# Patient Record
Sex: FEMALE | Race: BLACK OR AFRICAN AMERICAN | Employment: OTHER | URBAN - METROPOLITAN AREA
[De-identification: names, ages, dates, MRNs, and addresses within clinical notes are randomized per-mention and may not be internally consistent; named-entity substitution may affect disease eponyms.]

---

## 2018-03-29 ENCOUNTER — HOSPITAL ENCOUNTER (OUTPATIENT)
Dept: LAB | Age: 83
Discharge: HOME | End: 2018-03-29

## 2019-12-26 ENCOUNTER — HOSPITAL ENCOUNTER (EMERGENCY)
Age: 84
Discharge: HOME | End: 2019-12-26
Attending: EMERGENCY MEDICINE
Payer: MEDICARE

## 2019-12-26 VITALS
TEMPERATURE: 97.6 F | HEIGHT: 66 IN | WEIGHT: 152.56 LBS | DIASTOLIC BLOOD PRESSURE: 88 MMHG | SYSTOLIC BLOOD PRESSURE: 147 MMHG | BODY MASS INDEX: 24.52 KG/M2 | RESPIRATION RATE: 16 BRPM | OXYGEN SATURATION: 95 % | HEART RATE: 89 BPM

## 2019-12-26 DIAGNOSIS — N93.9 VAGINAL BLEEDING: ICD-10-CM

## 2019-12-26 DIAGNOSIS — T19.2XXA VAGINAL FOREIGN BODY, INITIAL ENCOUNTER: Primary | ICD-10-CM

## 2019-12-26 LAB — INR BLD: 1.6 (ref 0.9–1.2)

## 2019-12-26 PROCEDURE — 85610 PROTHROMBIN TIME: CPT

## 2019-12-26 PROCEDURE — 99283 EMERGENCY DEPT VISIT LOW MDM: CPT

## 2019-12-26 RX ORDER — FUROSEMIDE 80 MG/1
80 TABLET ORAL DAILY
COMMUNITY

## 2019-12-26 RX ORDER — METRONIDAZOLE 7.5 MG/G
CREAM TOPICAL 2 TIMES DAILY
COMMUNITY
End: 2021-09-24

## 2019-12-26 RX ORDER — OMEPRAZOLE 20 MG/1
20 CAPSULE, DELAYED RELEASE ORAL DAILY
COMMUNITY
End: 2021-09-24

## 2019-12-26 RX ORDER — WARFARIN 1 MG/1
7.5 TABLET ORAL DAILY
COMMUNITY

## 2019-12-26 RX ORDER — DOCUSATE SODIUM 100 MG/1
100 CAPSULE, LIQUID FILLED ORAL
COMMUNITY

## 2019-12-26 RX ORDER — WARFARIN 2.5 MG/1
5 TABLET ORAL DAILY
COMMUNITY

## 2019-12-26 RX ORDER — GUAIFENESIN 100 MG/5ML
81 LIQUID (ML) ORAL DAILY
COMMUNITY

## 2019-12-26 RX ORDER — METOLAZONE 2.5 MG/1
2.5 TABLET ORAL DAILY
COMMUNITY

## 2019-12-26 RX ORDER — POTASSIUM CHLORIDE 20 MEQ/1
60 TABLET, EXTENDED RELEASE ORAL 2 TIMES DAILY
COMMUNITY

## 2019-12-26 RX ORDER — SENNOSIDES 8.6 MG/1
2 TABLET ORAL DAILY
COMMUNITY

## 2019-12-26 NOTE — DISCHARGE INSTRUCTIONS
Patient Education        Object in the Vagina: Care Instructions  Your Care Instructions    If something is left in the vagina for too long, it can cause pain and irritation. This could be a tampon, a diaphragm, a pessary, or a vibrator. Sometimes, a condom comes off during sex and gets lost in the vagina. You may have bleeding, a bad smell, and a discharge from the vagina. After the object is taken out, symptoms usually go away. Follow-up care is a key part of your treatment and safety. Be sure to make and go to all appointments, and call your doctor if you are having problems. It's also a good idea to know your test results and keep a list of the medicines you take. How can you care for yourself at home? · If your doctor prescribed antibiotics, take them as directed. Do not stop taking them just because you feel better. You need to take the full course of antibiotics. · Do not use a douche or vaginal wash unless your doctor tells you to. · You can prevent future problems if you limit how long something is in your vagina. For example, change a tampon at least every 4 to 8 hours. When should you call for help? Watch closely for changes in your health, and be sure to contact your doctor if:    · Your symptoms do not improve, or they get worse.     · You have a fever. Where can you learn more? Go to http://zeke-demar.info/. Enter T994 in the search box to learn more about \"Object in the Vagina: Care Instructions. \"  Current as of: June 26, 2019  Content Version: 12.2  © 5330-3037 [employer]. Care instructions adapted under license by [employer] (which disclaims liability or warranty for this information). If you have questions about a medical condition or this instruction, always ask your healthcare professional. [address] any warranty or liability for your use of this information.

## 2019-12-26 NOTE — ED TRIAGE NOTES
\"I was masturbating and the cap of the perfume bottle is up in my vaginia and I cant get it out\". Happened between 3-4pm today. Denies pain. Tried to remove it but couldn't it out.

## 2019-12-26 NOTE — ED PROVIDER NOTES
Pt was masturbating a few hours ago with a perfume bottle and the cap is not accounted for. She tried to get the cap out with her fingers. She did experience some mild bleeding from her vagina. She has not had a hysterectomy. She does not see gynecology on a regular basis. No abdominal pain. No feeling faint. No recent illness. Old chart reviewed: No prior ED visits. She had an orthopedic appointment earlier this month for knee osteoarthritis. That was with [facility]. Past Medical History:   Diagnosis Date    Arthritis     Heart failure ([address]     \"leaky valve\"       Past Surgical History:   Procedure Laterality Date    HX GI      rectal    HX HEENT      \"eye surgery\"    HX PACEMAKER           History reviewed. No pertinent family history.     Social History     Socioeconomic History    Marital status:      Spouse name: Not on file    Number of children: Not on file    Years of education: Not on file    Highest education level: Not on file   Occupational History    Not on file   Social Needs    Financial resource strain: Not on file    Food insecurity:     Worry: Not on file     Inability: Not on file    Transportation needs:     Medical: Not on file     Non-medical: Not on file   Tobacco Use    Smoking status: Never Smoker    Smokeless tobacco: Never Used   Substance and Sexual Activity    Alcohol use: Yes     Comment: occasionally has 1 glass of beer or wine    Drug use: Never    Sexual activity: Not on file   Lifestyle    Physical activity:     Days per week: Not on file     Minutes per session: Not on file    Stress: Not on file   Relationships    Social connections:     Talks on phone: Not on file     Gets together: Not on file     Attends Gnosticist service: Not on file     Active member of club or organization: Not on file     Attends meetings of clubs or organizations: Not on file     Relationship status: Not on file    Intimate partner violence: Fear of current or ex partner: Not on file     Emotionally abused: Not on file     Physically abused: Not on file     Forced sexual activity: Not on file   Other Topics Concern    Not on file   Social History Narrative    Not on file         ALLERGIES: Patient has no known allergies. Review of Systems   All other systems reviewed and are negative. Vitals:    12/26/19 1802   BP: 147/88   Pulse: 89   Resp: 16   Temp: 97.6 °F (36.4 °C)   SpO2: 95%   Weight: 69.2 kg (152 lb 8.9 oz)   Height: 5' 6\" (1.676 m)            Physical Exam  Constitutional:       Comments: Frail, elderly   HENT:      Head: Normocephalic. Nose: Nose normal.      Mouth/Throat:      Mouth: Mucous membranes are moist.   Eyes:      Pupils: Pupils are equal, round, and reactive to light. Cardiovascular:      Rate and Rhythm: Normal rate and regular rhythm. Pulses: Normal pulses. Pulmonary:      Effort: Pulmonary effort is normal.   Abdominal:      General: Abdomen is flat. Palpations: Abdomen is soft. Tenderness: There is no tenderness. Genitourinary:     Comments: On vaginal exam via speculum, there is a large perfume bottle cap that was oriented transversely. It is plastic. There is an edge to it. There was a minimum amount of blood in the vagina. Source of trauma cannot be visualized. There is certainly no brisk bleeding. Musculoskeletal:         General: No swelling. Skin:     General: Skin is warm and dry. Capillary Refill: Capillary refill takes less than 2 seconds. Neurological:      Mental Status: She is alert. Psychiatric:         Mood and Affect: Mood normal.          MDM       Procedures        While in the [facility], I tried with the ring forceps several times to get the bottle cap out without success. I then used to the Abel forceps from the intubation kit. I was able to grab a hold of the edge of the bottle, take the speculum out while still holding onto the bottle.   Then it came out easily. It was a little uncomfortable for the patient but that passed. No pain after the procedure was done. D/w Dr Redmond Filler [facility]) - f/u prn in office only if pt wants to.       Labs Reviewed   POC INR - Abnormal; Notable for the following components:       Result Value    INR (POC) 1.6 (*)     All other components within normal limits

## 2019-12-27 NOTE — ED NOTES
I have reviewed discharge instructions with the patient. The patient verbalized understanding. ambulatory to vehicle with family. Nad. No c/o.

## 2021-09-24 ENCOUNTER — APPOINTMENT (OUTPATIENT)
Dept: GENERAL RADIOLOGY | Age: 86
End: 2021-09-24
Attending: EMERGENCY MEDICINE
Payer: MEDICARE

## 2021-09-24 ENCOUNTER — HOSPITAL ENCOUNTER (EMERGENCY)
Age: 86
Discharge: HOME | End: 2021-09-24
Attending: EMERGENCY MEDICINE
Payer: MEDICARE

## 2021-09-24 ENCOUNTER — APPOINTMENT (OUTPATIENT)
Dept: CT IMAGING | Age: 86
End: 2021-09-24
Attending: EMERGENCY MEDICINE
Payer: MEDICARE

## 2021-09-24 VITALS
BODY MASS INDEX: 25.01 KG/M2 | RESPIRATION RATE: 23 BRPM | OXYGEN SATURATION: 92 % | WEIGHT: 154.98 LBS | SYSTOLIC BLOOD PRESSURE: 125 MMHG | TEMPERATURE: 98.4 F | HEART RATE: 81 BPM | DIASTOLIC BLOOD PRESSURE: 83 MMHG

## 2021-09-24 DIAGNOSIS — R53.83 FATIGUE, UNSPECIFIED TYPE: Primary | ICD-10-CM

## 2021-09-24 DIAGNOSIS — N30.90 CYSTITIS: ICD-10-CM

## 2021-09-24 LAB
ALBUMIN SERPL-MCNC: 3.9 G/DL (ref 3.5–5)
ALBUMIN/GLOB SERPL: 0.9 {RATIO} (ref 1.1–2.2)
ALP SERPL-CCNC: 95 U/L (ref 45–117)
ALT SERPL-CCNC: 19 U/L (ref 12–78)
ANION GAP SERPL CALC-SCNC: 11 MMOL/L (ref 5–15)
APPEARANCE UR: ABNORMAL
AST SERPL-CCNC: 31 U/L (ref 15–37)
ATRIAL RATE: 76 BPM
BACTERIA URNS QL MICRO: ABNORMAL /HPF
BASOPHILS # BLD: 0.1 K/UL (ref 0–0.1)
BASOPHILS NFR BLD: 1 % (ref 0–1)
BILIRUB SERPL-MCNC: 1.2 MG/DL (ref 0.2–1)
BILIRUB UR QL: NEGATIVE
BNP SERPL-MCNC: 2114 PG/ML (ref 0–450)
BUN SERPL-MCNC: 15 MG/DL (ref 6–20)
BUN/CREAT SERPL: 12 (ref 12–20)
CALCIUM SERPL-MCNC: 9.6 MG/DL (ref 8.5–10.1)
CALCULATED R AXIS, ECG10: -39 DEGREES
CALCULATED T AXIS, ECG11: -4 DEGREES
CHLORIDE SERPL-SCNC: 99 MMOL/L (ref 97–108)
CO2 SERPL-SCNC: 28 MMOL/L (ref 21–32)
COLOR UR: ABNORMAL
COMMENT, HOLDF: NORMAL
CREAT SERPL-MCNC: 1.23 MG/DL (ref 0.55–1.02)
DIAGNOSIS, 93000: NORMAL
DIFFERENTIAL METHOD BLD: ABNORMAL
EOSINOPHIL # BLD: 0.1 K/UL (ref 0–0.4)
EOSINOPHIL NFR BLD: 2 % (ref 0–7)
EPITH CASTS URNS QL MICRO: ABNORMAL /LPF
ERYTHROCYTE [DISTWIDTH] IN BLOOD BY AUTOMATED COUNT: 13.2 % (ref 11.5–14.5)
GLOBULIN SER CALC-MCNC: 4.2 G/DL (ref 2–4)
GLUCOSE SERPL-MCNC: 166 MG/DL (ref 65–100)
GLUCOSE UR STRIP.AUTO-MCNC: NEGATIVE MG/DL
HCT VFR BLD AUTO: 38.8 % (ref 35–47)
HGB BLD-MCNC: 13.1 G/DL (ref 11.5–16)
HGB UR QL STRIP: ABNORMAL
IMM GRANULOCYTES # BLD AUTO: 0 K/UL (ref 0–0.04)
IMM GRANULOCYTES NFR BLD AUTO: 0 % (ref 0–0.5)
KETONES UR QL STRIP.AUTO: NEGATIVE MG/DL
LEUKOCYTE ESTERASE UR QL STRIP.AUTO: ABNORMAL
LYMPHOCYTES # BLD: 1.1 K/UL (ref 0.8–3.5)
LYMPHOCYTES NFR BLD: 21 % (ref 12–49)
MCH RBC QN AUTO: 26.7 PG (ref 26–34)
MCHC RBC AUTO-ENTMCNC: 33.8 G/DL (ref 30–36.5)
MCV RBC AUTO: 79 FL (ref 80–99)
MONOCYTES # BLD: 0.5 K/UL (ref 0–1)
MONOCYTES NFR BLD: 10 % (ref 5–13)
NEUTS SEG # BLD: 3.5 K/UL (ref 1.8–8)
NEUTS SEG NFR BLD: 66 % (ref 32–75)
NITRITE UR QL STRIP.AUTO: NEGATIVE
NRBC # BLD: 0 K/UL (ref 0–0.01)
NRBC BLD-RTO: 0 PER 100 WBC
P-R INTERVAL, ECG05: 216 MS
PH UR STRIP: 7 [PH] (ref 5–8)
PLATELET # BLD AUTO: 191 K/UL (ref 150–400)
PMV BLD AUTO: 10.1 FL (ref 8.9–12.9)
POTASSIUM SERPL-SCNC: 3.1 MMOL/L (ref 3.5–5.1)
PROT SERPL-MCNC: 8.1 G/DL (ref 6.4–8.2)
PROT UR STRIP-MCNC: NEGATIVE MG/DL
Q-T INTERVAL, ECG07: 384 MS
QRS DURATION, ECG06: 132 MS
QTC CALCULATION (BEZET), ECG08: 467 MS
RBC # BLD AUTO: 4.91 M/UL (ref 3.8–5.2)
RBC #/AREA URNS HPF: ABNORMAL /HPF (ref 0–5)
SAMPLES BEING HELD,HOLD: NORMAL
SODIUM SERPL-SCNC: 138 MMOL/L (ref 136–145)
SP GR UR REFRACTOMETRY: 1.01 (ref 1–1.03)
TROPONIN I SERPL-MCNC: <0.05 NG/ML
UA: UC IF INDICATED,UAUC: ABNORMAL
UROBILINOGEN UR QL STRIP.AUTO: 1 EU/DL (ref 0.2–1)
VENTRICULAR RATE, ECG03: 89 BPM
WBC # BLD AUTO: 5.2 K/UL (ref 3.6–11)
WBC URNS QL MICRO: ABNORMAL /HPF (ref 0–4)

## 2021-09-24 PROCEDURE — 83880 ASSAY OF NATRIURETIC PEPTIDE: CPT

## 2021-09-24 PROCEDURE — 99285 EMERGENCY DEPT VISIT HI MDM: CPT

## 2021-09-24 PROCEDURE — 74011000258 HC RX REV CODE- 258: Performed by: EMERGENCY MEDICINE

## 2021-09-24 PROCEDURE — 85025 COMPLETE CBC W/AUTO DIFF WBC: CPT

## 2021-09-24 PROCEDURE — 74011250636 HC RX REV CODE- 250/636: Performed by: EMERGENCY MEDICINE

## 2021-09-24 PROCEDURE — 70450 CT HEAD/BRAIN W/O DYE: CPT

## 2021-09-24 PROCEDURE — 87186 SC STD MICRODIL/AGAR DIL: CPT

## 2021-09-24 PROCEDURE — 71045 X-RAY EXAM CHEST 1 VIEW: CPT

## 2021-09-24 PROCEDURE — 96365 THER/PROPH/DIAG IV INF INIT: CPT

## 2021-09-24 PROCEDURE — 87077 CULTURE AEROBIC IDENTIFY: CPT

## 2021-09-24 PROCEDURE — 93005 ELECTROCARDIOGRAM TRACING: CPT

## 2021-09-24 PROCEDURE — 81001 URINALYSIS AUTO W/SCOPE: CPT

## 2021-09-24 PROCEDURE — 87086 URINE CULTURE/COLONY COUNT: CPT

## 2021-09-24 PROCEDURE — 84484 ASSAY OF TROPONIN QUANT: CPT

## 2021-09-24 PROCEDURE — 80053 COMPREHEN METABOLIC PANEL: CPT

## 2021-09-24 RX ORDER — NITROFURANTOIN 25; 75 MG/1; MG/1
100 CAPSULE ORAL 2 TIMES DAILY
Qty: 12 CAPSULE | Refills: 0 | Status: SHIPPED | OUTPATIENT
Start: 2021-09-24 | End: 2021-09-30

## 2021-09-24 RX ORDER — NITROFURANTOIN 25; 75 MG/1; MG/1
100 CAPSULE ORAL 2 TIMES DAILY
Qty: 12 CAPSULE | Refills: 0 | Status: SHIPPED | OUTPATIENT
Start: 2021-09-24 | End: 2021-09-24 | Stop reason: SDUPTHER

## 2021-09-24 RX ADMIN — SODIUM CHLORIDE 1000 ML: 9 INJECTION, SOLUTION INTRAVENOUS at 14:41

## 2021-09-24 RX ADMIN — CEFTRIAXONE SODIUM 1 G: 1 INJECTION, POWDER, FOR SOLUTION INTRAMUSCULAR; INTRAVENOUS at 17:09

## 2021-09-24 NOTE — ED NOTES
Pt ambulatory out of ED with discharge instructions and prescriptions in hand given by Dr. Ashleigh Soctt; pt verbalized understanding of discharge paperwork and time allotted for questions. VSS. Pt alert and oriented. Pt accompanied by family member.

## 2021-09-24 NOTE — ED TRIAGE NOTES
Triage: pt c/o generalized weakness and dizziness for several days but worse this AM. Denies SOB, cough, chest pain, fever or blurred vision.

## 2021-09-24 NOTE — ED PROVIDER NOTES
59-year-old female history of arthritis, leaky heart valve presents to the emergency department with chief complaint of feeling generalized dizziness and weakness for the past couple days which is worse today. She woke up early without eating to go to a test, outpatient ultrasound, and is felt generalized fatigue. No fevers or shortness of breath. No chest pain. No nausea or vomiting. The history is provided by the patient and medical records. Dizziness  This is a new problem. The current episode started more than 2 days ago. The problem has been rapidly worsening. Pertinent negatives include no focal weakness, no loss of sensation, no speech difficulty, no memory loss, no movement disorder, no agitation, no visual change, no mental status change and no disorientation. There has been no fever. Pertinent negatives include no shortness of breath, no chest pain, no vomiting, no altered mental status, no confusion, no headaches, no choking, no nausea, no bowel incontinence and no bladder incontinence. Associated medical issues do not include trauma or seizures. Fatigue  Pertinent negatives include no focal weakness, no loss of sensation, no speech difficulty, no memory loss, no movement disorder, no agitation, no visual change, no mental status change and no disorientation. Pertinent negatives include no shortness of breath, no chest pain, no vomiting, no altered mental status, no confusion, no headaches, no choking, no nausea, no bowel incontinence and no bladder incontinence. Associated medical issues do not include trauma or seizures. Past Medical History:   Diagnosis Date    Arthritis     Heart failure ([address]     \"leaky valve\"       Past Surgical History:   Procedure Laterality Date    HX GI      rectal    HX HEENT      \"eye surgery\"    HX PACEMAKER           History reviewed. No pertinent family history.     Social History     Socioeconomic History    Marital status:      Spouse name: Not on file    Number of children: Not on file    Years of education: Not on file    Highest education level: Not on file   Occupational History    Not on file   Tobacco Use    Smoking status: Never Smoker    Smokeless tobacco: Never Used   Substance and Sexual Activity    Alcohol use: Yes     Comment: occasionally has 1 glass of beer or wine    Drug use: Never    Sexual activity: Not on file   Other Topics Concern    Not on file   Social History Narrative    Not on file     Social Determinants of Health     Financial Resource Strain:     Difficulty of Paying Living Expenses:    Food Insecurity:     Worried About Running Out of Food in the Last Year:     [address] in the Last Year:    Transportation Needs:     Lack of Transportation (Medical):  Lack of Transportation (Non-Medical):    Physical Activity:     Days of Exercise per Week:     Minutes of Exercise per Session:    Stress:     Feeling of Stress :    Social Connections:     Frequency of Communication with Friends and Family:     Frequency of Social Gatherings with Friends and Family:     Attends Voodoo Services:     Active Member of Clubs or Organizations:     Attends Club or Organization Meetings:     Marital Status:    Intimate Partner Violence:     Fear of Current or Ex-Partner:     Emotionally Abused:     Physically Abused:     Sexually Abused: ALLERGIES: Patient has no known allergies. Review of Systems   Constitutional: Positive for fatigue. Negative for fever. HENT: Negative for sneezing and sore throat. Respiratory: Negative for cough, choking and shortness of breath. Cardiovascular: Negative for chest pain and leg swelling. Gastrointestinal: Negative for abdominal pain, bowel incontinence, diarrhea, nausea and vomiting. Genitourinary: Negative for bladder incontinence, difficulty urinating and dysuria. Musculoskeletal: Negative for arthralgias and myalgias.    Skin: Negative for color change and rash. Neurological: Positive for dizziness. Negative for focal weakness, speech difficulty, weakness and headaches. Psychiatric/Behavioral: Negative for agitation, behavioral problems, confusion and memory loss. There were no vitals filed for this visit. Physical Exam  Vitals and nursing note reviewed. Constitutional:       General: She is not in acute distress. Appearance: Normal appearance. She is well-developed. She is not ill-appearing, toxic-appearing or diaphoretic. HENT:      Head: Normocephalic and atraumatic. Nose: Nose normal.      Mouth/Throat:      Mouth: Mucous membranes are moist.      Pharynx: Oropharynx is clear. Eyes:      Extraocular Movements: Extraocular movements intact. Conjunctiva/sclera: Conjunctivae normal.      Pupils: Pupils are equal, round, and reactive to light. Cardiovascular:      Rate and Rhythm: Normal rate and regular rhythm. Pulses: Normal pulses. Heart sounds: Normal heart sounds. Pulmonary:      Effort: Pulmonary effort is normal. No respiratory distress. Breath sounds: Normal breath sounds. No wheezing. Chest:      Chest wall: No tenderness. Abdominal:      General: Abdomen is flat. There is no distension. Palpations: Abdomen is soft. Tenderness: There is no abdominal tenderness. There is no guarding or rebound. Musculoskeletal:         General: No swelling, tenderness, deformity or signs of injury. Normal range of motion. Cervical back: Normal range of motion and neck supple. No rigidity. No muscular tenderness. Right lower leg: Edema present. Left lower leg: No edema. Skin:     General: Skin is warm and dry. Capillary Refill: Capillary refill takes less than 2 seconds. Neurological:      General: No focal deficit present. Mental Status: She is alert and oriented to person, place, and time.    Psychiatric:         Mood and Affect: Mood normal.         Behavior: Behavior normal.          MDM  Number of Diagnoses or Management Options  Diagnosis management comments: 24-year-old female presents as above with generalized dizziness and weakness. Her work-up is remarkable for mild dehydration with evidence of UTI. Plan to treat with antibiotics, follow-up with primary care, return if needed. Amount and/or Complexity of Data Reviewed  Clinical lab tests: reviewed  Tests in the radiology section of CPT®: reviewed  Tests in the medicine section of CPT®: reviewed           Procedures              ED EKG interpretation:  Rhythm: Sinus rhythm with first-degree AV block and rate of approximately 89 with left axis deviation and right bundle branch block morphology concerning for bifascicular block. ST segment: Nonspecific ST segment changes including precordial depressions. This EKG was interpreted by Alessio Barrett MD,ED Provider.

## 2021-09-26 LAB
BACTERIA SPEC CULT: ABNORMAL
CC UR VC: ABNORMAL
SERVICE CMNT-IMP: ABNORMAL

## 2021-12-21 ENCOUNTER — TRANSCRIBE ORDER (OUTPATIENT)
Dept: SCHEDULING | Age: 86
End: 2021-12-21

## 2021-12-21 DIAGNOSIS — H47.20 OPTIC NERVE ATROPHY: Primary | ICD-10-CM

## 2022-02-01 ENCOUNTER — TRANSCRIBE ORDER (OUTPATIENT)
Dept: SCHEDULING | Age: 87
End: 2022-02-01

## 2022-02-01 DIAGNOSIS — H35.50 OPTIC ATROPHY ASSOCIATED WITH RETINAL DYSTROPHIES: Primary | ICD-10-CM

## 2022-02-01 DIAGNOSIS — H47.20 OPTIC ATROPHY ASSOCIATED WITH RETINAL DYSTROPHIES: Primary | ICD-10-CM

## 2022-02-11 ENCOUNTER — HOSPITAL ENCOUNTER (OUTPATIENT)
Dept: CT IMAGING | Age: 87
Discharge: HOME | End: 2022-02-11
Attending: STUDENT IN AN ORGANIZED HEALTH CARE EDUCATION/TRAINING PROGRAM
Payer: MEDICARE

## 2022-02-11 DIAGNOSIS — H47.20 OPTIC ATROPHY ASSOCIATED WITH RETINAL DYSTROPHIES: ICD-10-CM

## 2022-02-11 DIAGNOSIS — H35.50 OPTIC ATROPHY ASSOCIATED WITH RETINAL DYSTROPHIES: ICD-10-CM

## 2022-02-11 PROCEDURE — 70480 CT ORBIT/EAR/FOSSA W/O DYE: CPT

## 2022-12-13 ENCOUNTER — OFFICE VISIT (OUTPATIENT)
Dept: GYNECOLOGY | Age: 87
End: 2022-12-13
Payer: MEDICARE

## 2022-12-13 VITALS
WEIGHT: 160 LBS | HEART RATE: 72 BPM | BODY MASS INDEX: 25.71 KG/M2 | SYSTOLIC BLOOD PRESSURE: 132 MMHG | HEIGHT: 66 IN | DIASTOLIC BLOOD PRESSURE: 86 MMHG

## 2022-12-13 DIAGNOSIS — R93.89 THICKENED ENDOMETRIUM: ICD-10-CM

## 2022-12-13 DIAGNOSIS — N95.0 POSTMENOPAUSAL BLEEDING: Primary | ICD-10-CM

## 2022-12-13 PROCEDURE — 1123F ACP DISCUSS/DSCN MKR DOCD: CPT | Performed by: OBSTETRICS & GYNECOLOGY

## 2022-12-13 PROCEDURE — 1101F PT FALLS ASSESS-DOCD LE1/YR: CPT | Performed by: OBSTETRICS & GYNECOLOGY

## 2022-12-13 PROCEDURE — G8432 DEP SCR NOT DOC, RNG: HCPCS | Performed by: OBSTETRICS & GYNECOLOGY

## 2022-12-13 PROCEDURE — G8417 CALC BMI ABV UP PARAM F/U: HCPCS | Performed by: OBSTETRICS & GYNECOLOGY

## 2022-12-13 PROCEDURE — 1090F PRES/ABSN URINE INCON ASSESS: CPT | Performed by: OBSTETRICS & GYNECOLOGY

## 2022-12-13 PROCEDURE — G0463 HOSPITAL OUTPT CLINIC VISIT: HCPCS | Performed by: OBSTETRICS & GYNECOLOGY

## 2022-12-13 PROCEDURE — G8536 NO DOC ELDER MAL SCRN: HCPCS | Performed by: OBSTETRICS & GYNECOLOGY

## 2022-12-13 PROCEDURE — G8427 DOCREV CUR MEDS BY ELIG CLIN: HCPCS | Performed by: OBSTETRICS & GYNECOLOGY

## 2022-12-13 PROCEDURE — 99204 OFFICE O/P NEW MOD 45 MIN: CPT | Performed by: OBSTETRICS & GYNECOLOGY

## 2022-12-13 RX ORDER — VALACYCLOVIR HYDROCHLORIDE 500 MG/1
TABLET, FILM COATED ORAL
COMMUNITY
Start: 2022-12-06

## 2022-12-13 NOTE — PROGRESS NOTES
[address] [place] [address] [address]  P [phone]  F [phone]    Office Note  Patient ID:  Name:  Meena Prakash  MRN:  [id number]  :  1935/87 y.o. Date:  2022      HISTORY OF PRESENT ILLNESS:  Meena Prakash is a 80 y.o.  postmenopausal female who is a new patient being seen for postmenopausal bleeding. She is referred by Dr. Ita Mak with [facility]. She had presented to the ER at [facility] with vaginal bleeding. An ultrasound there demonstrated uterine fibroids and a thickened endometrium. An endometrial biopsy was attempted in the office, but unsuccessful. The cervix was reportedly flush with the vaginal apex. I have been asked to see her in consultation for further evaluation and management of her bleeding. She has congestive heart failure and was told that she is not a candidate for any kind of surgery. She last saw her cardiologist about 3 months ago and has another appointment scheduled next month. ROS:   and GI review:  Negative  Cardiopulmonary review:  Negative   Musculoskeletal:  Negative    A comprehensive review of systems was negative except for that written in the History of Present Illness. , 10 point ROS      OB/GYN ROS/HX:    Vaginal delivery x 1      Problem List:  There are no problems to display for this patient. PMH:  Past Medical History:   Diagnosis Date    Arthritis     Heart failure ([address]     \"leaky valve\"      PSH:  Past Surgical History:   Procedure Laterality Date    HX GI      rectal    HX HEENT      \"eye surgery\"    HX PACEMAKER        Social History:  Social History     Tobacco Use    Smoking status: Never    Smokeless tobacco: Never   Substance Use Topics    Alcohol use: Yes     Comment: occasionally has 1 glass of beer or wine      Family History:  History reviewed. No pertinent family history.    Medications: (reviewed)  Current Outpatient Medications   Medication Sig    valACYclovir (VALTREX) 500 mg tablet     aspirin 81 mg chewable tablet Take 81 mg by mouth daily. senna (SENOKOT) 8.6 mg tablet Take 2 Tabs by mouth daily. docusate sodium (COLACE) 100 mg capsule Take 100 mg by mouth nightly. furosemide (LASIX) 80 mg tablet Take 80 mg by mouth daily. warfarin (COUMADIN) 2.5 mg tablet Take 5 mg by mouth daily. Monday - Wednesday - Friday    warfarin (COUMADIN) 1 mg tablet Take 7.5 mg by mouth daily. Tuesday - Thursday - Saturday - Sunday    potassium chloride (K-DUR, KLOR-CON) 20 mEq tablet Take 60 mEq by mouth two (2) times a day. metOLazone (ZAROXOLYN) 2.5 mg tablet Take 5 mg by mouth daily. Takes one every third day. No current facility-administered medications for this visit. Allergies: (reviewed)  No Known Allergies       OBJECTIVE:    Physical Exam:  VITAL SIGNS: Vitals:    12/13/22 1053   BP: 132/86   Pulse: 72   Weight: 160 lb (72.6 kg)   Height: 5' 5.98\" (1.676 m)     Body mass index is 25.84 kg/m². GENERAL JAIRO: Conversant, alert, oriented. No acute distress. HEENT: HEENT. No thyroid enlargement. No JVD. Neck: Supple without restrictions. RESPIRATORY: Clear to auscultation and percussion to the bases. No CVAT. CARDIOVASC: RRR without murmur/rub. GASTROINT: soft, non-tender, without masses or organomegaly   MUSCULOSKEL: no joint tenderness, deformity or swelling   EXTREMITIES: extremities normal, atraumatic, no cyanosis or edema   PELVIC: Normal external genitalia. Normal vagina. Small dimple of a cervix. Uterus not enlarged and mobile. Large amount of stool in the rectum. RECTAL: Deferred   MACRINA SURVEY: No suspicious lymphadenopathy or edema noted. NEURO: Grossly intact. No acute deficit.        Lab Data:    Lab Results   Component Value Date/Time    WBC 5.2 09/24/2021 01:34 PM    HGB 13.1 09/24/2021 01:34 PM    HCT 38.8 09/24/2021 01:34 PM    PLATELET 683 40/63/9919 01:34 PM    MCV 79.0 (L) 09/24/2021 01:34 PM     Lab Results   Component Value Date/Time    Sodium 138 09/24/2021 01:34 PM    Potassium 3.1 (L) 09/24/2021 01:34 PM    Chloride 99 09/24/2021 01:34 PM    CO2 28 09/24/2021 01:34 PM    Anion gap 11 09/24/2021 01:34 PM    Glucose 166 (H) 09/24/2021 01:34 PM    BUN 15 09/24/2021 01:34 PM    Creatinine 1.23 (H) 09/24/2021 01:34 PM    BUN/Creatinine ratio 12 09/24/2021 01:34 PM    GFR est AA 50 (L) 09/24/2021 01:34 PM    GFR est non-AA 41 (L) 09/24/2021 01:34 PM    Calcium 9.6 09/24/2021 01:34 PM         Imaging: Outside imaging unavailable at time of visit. IMPRESSION/PLAN:  Urmila Blair is a 80 y.o. female with a diagnosis of postmenopausal bleeding, associated with a thickened endometrium. I reviewed with Urmila Blair her medical records, physical exam, and review of symptoms. I discussed with her and her daughter the differential diagnosis of her bleeding, including both benign and malignant conditions. I recommended a hysteroscopy/D&C to evaluate the bleeding and thickened endometrium. We should be able to do that with minimal anesthesia. She was counseled on the risks, benefits, indications and alternatives of the planned procedure. We discussed the possible surgical risks of bleeding, infection, potential injury to other organs/structures, and the risks of anesthesia. Her questions were answered to her satisfaction and she wishes to proceed as planned. If we find that a malignancy is present, we will then need to discuss the possibility of hysterectomy. We will then need clearance from her cardiologist.        An electronic signature was used to sign this note.     Meera Soria MD  12/13/22

## 2022-12-13 NOTE — LETTER
2022 12:40 PM    Patient: Meena Prakash   YOB: 1935  Date of Visit: 2022      Dear Ita Mak MD  [address] [address]  Via In [facility] [place]  [address] [address]  Via Fax: [phone]:      Thank you for referring Ms. Meena Prakash to me for evaluation/treatment. Below are the relevant portions of my assessment and plan of care. [address], [address]  P [phone]  F [phone]    Office Note  Patient ID:  Name:  Meena Prakash  MRN:  [id number]  :  1935/87 y.o. Date:  2022      HISTORY OF PRESENT ILLNESS:  Meena Prakash is a 80 y.o.  postmenopausal female who is a new patient being seen for postmenopausal bleeding. She is referred by Dr. Ita Mak with [facility]. She had presented to the ER at [facility] with vaginal bleeding. An ultrasound there demonstrated uterine fibroids and a thickened endometrium. An endometrial biopsy was attempted in the office, but unsuccessful. The cervix was reportedly flush with the vaginal apex. I have been asked to see her in consultation for further evaluation and management of her bleeding. She has congestive heart failure and was told that she is not a candidate for any kind of surgery. She last saw her cardiologist about 3 months ago and has another appointment scheduled next month. ROS:   and GI review:  Negative  Cardiopulmonary review:  Negative   Musculoskeletal:  Negative    A comprehensive review of systems was negative except for that written in the History of Present Illness. , 10 point ROS      OB/GYN ROS/HX:    Vaginal delivery x 1      Problem List:  There are no problems to display for this patient.     PMH:  Past Medical History:   Diagnosis Date    Arthritis     Heart failure ([address]     \"leaky valve\" PSH:  Past Surgical History:   Procedure Laterality Date    HX GI      rectal    HX HEENT      \"eye surgery\"    HX PACEMAKER        Social History:  Social History     Tobacco Use    Smoking status: Never    Smokeless tobacco: Never   Substance Use Topics    Alcohol use: Yes     Comment: occasionally has 1 glass of beer or wine      Family History:  History reviewed. No pertinent family history. Medications: (reviewed)  Current Outpatient Medications   Medication Sig    valACYclovir (VALTREX) 500 mg tablet     aspirin 81 mg chewable tablet Take 81 mg by mouth daily.  senna (SENOKOT) 8.6 mg tablet Take 2 Tabs by mouth daily.  docusate sodium (COLACE) 100 mg capsule Take 100 mg by mouth nightly.  furosemide (LASIX) 80 mg tablet Take 80 mg by mouth daily.  warfarin (COUMADIN) 2.5 mg tablet Take 5 mg by mouth daily. Monday - Wednesday - Friday    warfarin (COUMADIN) 1 mg tablet Take 7.5 mg by mouth daily. Tuesday - Thursday - Saturday - Sunday    potassium chloride (K-DUR, KLOR-CON) 20 mEq tablet Take 60 mEq by mouth two (2) times a day.  metOLazone (ZAROXOLYN) 2.5 mg tablet Take 5 mg by mouth daily. Takes one every third day. No current facility-administered medications for this visit. Allergies: (reviewed)  No Known Allergies       OBJECTIVE:    Physical Exam:  VITAL SIGNS: Vitals:    12/13/22 1053   BP: 132/86   Pulse: 72   Weight: 160 lb (72.6 kg)   Height: 5' 5.98\" (1.676 m)     Body mass index is 25.84 kg/m². GENERAL JAIRO: Conversant, alert, oriented. No acute distress. HEENT: HEENT. No thyroid enlargement. No JVD. Neck: Supple without restrictions. RESPIRATORY: Clear to auscultation and percussion to the bases. No CVAT. CARDIOVASC: RRR without murmur/rub.    GASTROINT: soft, non-tender, without masses or organomegaly   MUSCULOSKEL: no joint tenderness, deformity or swelling   EXTREMITIES: extremities normal, atraumatic, no cyanosis or edema   PELVIC: Normal external genitalia. Normal vagina. Small dimple of a cervix. Uterus not enlarged and mobile. Large amount of stool in the rectum. RECTAL: Deferred   MACRINA SURVEY: No suspicious lymphadenopathy or edema noted. NEURO: Grossly intact. No acute deficit. Lab Data:    Lab Results   Component Value Date/Time    WBC 5.2 09/24/2021 01:34 PM    HGB 13.1 09/24/2021 01:34 PM    HCT 38.8 09/24/2021 01:34 PM    PLATELET 883 21/13/1214 01:34 PM    MCV 79.0 (L) 09/24/2021 01:34 PM     Lab Results   Component Value Date/Time    Sodium 138 09/24/2021 01:34 PM    Potassium 3.1 (L) 09/24/2021 01:34 PM    Chloride 99 09/24/2021 01:34 PM    CO2 28 09/24/2021 01:34 PM    Anion gap 11 09/24/2021 01:34 PM    Glucose 166 (H) 09/24/2021 01:34 PM    BUN 15 09/24/2021 01:34 PM    Creatinine 1.23 (H) 09/24/2021 01:34 PM    BUN/Creatinine ratio 12 09/24/2021 01:34 PM    GFR est AA 50 (L) 09/24/2021 01:34 PM    GFR est non-AA 41 (L) 09/24/2021 01:34 PM    Calcium 9.6 09/24/2021 01:34 PM         Imaging: Outside imaging unavailable at time of visit. IMPRESSION/PLAN:  Kingsley Gray is a 80 y.o. female with a diagnosis of postmenopausal bleeding, associated with a thickened endometrium. I reviewed with Kingsley Gray her medical records, physical exam, and review of symptoms. I discussed with her and her daughter the differential diagnosis of her bleeding, including both benign and malignant conditions. I recommended a hysteroscopy/D&C to evaluate the bleeding and thickened endometrium. We should be able to do that with minimal anesthesia. She was counseled on the risks, benefits, indications and alternatives of the planned procedure. We discussed the possible surgical risks of bleeding, infection, potential injury to other organs/structures, and the risks of anesthesia. Her questions were answered to her satisfaction and she wishes to proceed as planned.   If we find that a malignancy is present, we will then need to discuss the possibility of hysterectomy. We will then need clearance from her cardiologist.        An electronic signature was used to sign this note. Jodi Barragan MD  12/13/22          New patient referred by [facility] for PMB. If you have questions, please do not hesitate to call me. I look forward to following Ms. Chavezbette Sigrid along with you.         Sincerely,      Jodi Barragan MD

## 2022-12-22 ENCOUNTER — HOSPITAL ENCOUNTER (OUTPATIENT)
Dept: GENERAL RADIOLOGY | Age: 87
Discharge: HOME | End: 2022-12-22
Attending: OBSTETRICS & GYNECOLOGY

## 2022-12-22 ENCOUNTER — HOSPITAL ENCOUNTER (OUTPATIENT)
Dept: PREADMISSION TESTING | Age: 87
End: 2022-12-22
Payer: MEDICARE

## 2022-12-22 VITALS
HEART RATE: 77 BPM | TEMPERATURE: 97.9 F | SYSTOLIC BLOOD PRESSURE: 125 MMHG | DIASTOLIC BLOOD PRESSURE: 78 MMHG | WEIGHT: 150 LBS | HEIGHT: 66 IN | BODY MASS INDEX: 24.11 KG/M2

## 2022-12-22 LAB
ALBUMIN SERPL-MCNC: 3.8 G/DL (ref 3.5–5)
ALBUMIN/GLOB SERPL: 1.2 {RATIO} (ref 1.1–2.2)
ALP SERPL-CCNC: 64 U/L (ref 45–117)
ALT SERPL-CCNC: 20 U/L (ref 12–78)
ANION GAP SERPL CALC-SCNC: 4 MMOL/L (ref 5–15)
APTT PPP: 46.3 SEC (ref 22.1–31)
AST SERPL-CCNC: 28 U/L (ref 15–37)
BASOPHILS # BLD: 0.1 K/UL (ref 0–0.1)
BASOPHILS NFR BLD: 1 % (ref 0–1)
BILIRUB SERPL-MCNC: 1.3 MG/DL (ref 0.2–1)
BUN SERPL-MCNC: 30 MG/DL (ref 6–20)
BUN/CREAT SERPL: 27 (ref 12–20)
CALCIUM SERPL-MCNC: 9.4 MG/DL (ref 8.5–10.1)
CHLORIDE SERPL-SCNC: 104 MMOL/L (ref 97–108)
CO2 SERPL-SCNC: 29 MMOL/L (ref 21–32)
CREAT SERPL-MCNC: 1.11 MG/DL (ref 0.55–1.02)
DIFFERENTIAL METHOD BLD: NORMAL
EOSINOPHIL # BLD: 0.1 K/UL (ref 0–0.4)
EOSINOPHIL NFR BLD: 2 % (ref 0–7)
ERYTHROCYTE [DISTWIDTH] IN BLOOD BY AUTOMATED COUNT: 12.5 % (ref 11.5–14.5)
GLOBULIN SER CALC-MCNC: 3.3 G/DL (ref 2–4)
GLUCOSE SERPL-MCNC: 177 MG/DL (ref 65–100)
HCT VFR BLD AUTO: 36.1 % (ref 35–47)
HGB BLD-MCNC: 12 G/DL (ref 11.5–16)
IMM GRANULOCYTES # BLD AUTO: 0 K/UL (ref 0–0.04)
IMM GRANULOCYTES NFR BLD AUTO: 0 % (ref 0–0.5)
INR PPP: 3.4 (ref 0.9–1.1)
LYMPHOCYTES # BLD: 1 K/UL (ref 0.8–3.5)
LYMPHOCYTES NFR BLD: 17 % (ref 12–49)
MCH RBC QN AUTO: 28.9 PG (ref 26–34)
MCHC RBC AUTO-ENTMCNC: 33.2 G/DL (ref 30–36.5)
MCV RBC AUTO: 87 FL (ref 80–99)
MONOCYTES # BLD: 0.7 K/UL (ref 0–1)
MONOCYTES NFR BLD: 12 % (ref 5–13)
NEUTS SEG # BLD: 3.8 K/UL (ref 1.8–8)
NEUTS SEG NFR BLD: 68 % (ref 32–75)
NRBC # BLD: 0 K/UL (ref 0–0.01)
NRBC BLD-RTO: 0 PER 100 WBC
PLATELET # BLD AUTO: 169 K/UL (ref 150–400)
PMV BLD AUTO: 9.9 FL (ref 8.9–12.9)
POTASSIUM SERPL-SCNC: 4.3 MMOL/L (ref 3.5–5.1)
PROT SERPL-MCNC: 7.1 G/DL (ref 6.4–8.2)
PROTHROMBIN TIME: 33.2 SEC (ref 9–11.1)
RBC # BLD AUTO: 4.15 M/UL (ref 3.8–5.2)
SODIUM SERPL-SCNC: 137 MMOL/L (ref 136–145)
THERAPEUTIC RANGE,PTTT: ABNORMAL SECS (ref 58–77)
WBC # BLD AUTO: 5.6 K/UL (ref 3.6–11)

## 2022-12-22 PROCEDURE — 85730 THROMBOPLASTIN TIME PARTIAL: CPT

## 2022-12-22 PROCEDURE — 85610 PROTHROMBIN TIME: CPT

## 2022-12-22 PROCEDURE — 85025 COMPLETE CBC W/AUTO DIFF WBC: CPT

## 2022-12-22 PROCEDURE — 80053 COMPREHEN METABOLIC PANEL: CPT

## 2022-12-22 PROCEDURE — 36415 COLL VENOUS BLD VENIPUNCTURE: CPT

## 2022-12-22 NOTE — PERIOP NOTES
PATIENT GIVEN WRITTEN INSTRUCTIONS TO GO TO THE IMAGING CENTER/CANCER CENTER [address] [facility] [address] TO HAVE CHEST XRAY COMPLETED. COPY OF COVID CARD ON CHART.

## 2022-12-22 NOTE — PERIOP NOTES
[address] INSTRUCTIONS    Surgery Date:   12/30/22    Your surgeon's office or [facility] staff will call you between 4 PM- 8 PM the day before surgery with your arrival time. If your surgery is on a Monday, you will receive a call the preceding Friday. Please report to [facility] Patient Access/Admitting on the 1st floor. Bring your insurance card, photo identification, and any copayment ( if applicable). If you are going home the same day of your surgery, you must have a responsible adult to drive you home. You need to have a responsible adult to stay with you the first 24 hours after surgery and you should not drive a car for 24 hours following your surgery. Nothing to eat or drink after midnight the night before surgery. This includes no water, gum, mints, coffee, juice, etc.  Please note special instructions, if applicable, below for medications. Do NOT drink alcohol or smoke 24 hours before surgery. STOP smoking for 14 days prior as it helps with breathing and healing after surgery. If you are being admitted to the hospital, please leave personal belongings/luggage in your car until you have an assigned hospital room number. Please wear comfortable clothes. Wear your glasses instead of contacts. We ask that all money, jewelry and valuables be left at home. Wear no make up, particularly mascara, the day of surgery. All body piercings, rings, and jewelry need to be removed and left at home. Please remove any nail polish or artificial nails from your fingernails. Please wear your hair loose or down. Please no pony-tails, buns, or any metal hair accessories. If you shower the morning of surgery, please do not apply any lotions or powders afterwards. You may wear deodorant, unless having breast surgery. Do not shave any body area within 24 hours of your surgery. Please follow all instructions to avoid any potential surgical cancellation.   Should your physical condition change, (i.e. fever, cold, flu, etc.) please notify your surgeon as soon as possible. It is important to be on time. If a situation occurs where you may be delayed, please call:  [phone] / [phone] on the day of surgery. The Preadmission Testing staff can be reached at [phone]. Special instructions: NONE      Current Outpatient Medications   Medication Sig    valACYclovir (VALTREX) 500 mg tablet daily. senna (SENOKOT) 8.6 mg tablet Take 2 Tablets by mouth as needed. docusate sodium (COLACE) 100 mg capsule Take 100 mg by mouth nightly. furosemide (LASIX) 80 mg tablet Take 80 mg by mouth daily. warfarin (COUMADIN) 2.5 mg tablet Take 5 mg by mouth daily. Monday - Wednesday - Friday    warfarin (COUMADIN) 1 mg tablet Take 7.5 mg by mouth daily. Tuesday - Thursday - Saturday - Sunday    potassium chloride (K-DUR, KLOR-CON) 20 mEq tablet Take 60 mEq by mouth two (2) times a day. metOLazone (ZAROXOLYN) 2.5 mg tablet Take 5 mg by mouth daily. Takes one every third day. No current facility-administered medications for this encounter. YOU MUST ONLY TAKE THESE MEDICATIONS THE MORNING OF SURGERY WITH A SIP OF WATER: VALACYCLOVIR, LASIX, POTASSIUM CHLORIDE    MEDICATIONS TO TAKE THE MORNING OF SURGERY ONLY IF NEEDED: METOLAZONE    HOLD these prescription medications BEFORE Surgery: NONE    Ask your surgeon/prescribing physician about when/if to STOP taking these medications: ASK DR. MINA ABOUT WHEN TO STOP WARFARIN    Stop all vitamins, herbal medicines and Aspirin containing products 7 days prior to surgery. Stop any non-steroidal anti-inflammatory drugs (i.e. Ibuprofen, Naproxen, Advil, Aleve) 3 days before surgery. You may take Tylenol. If you are currently taking Plavix, Coumadin, or any other blood-thinning/anticoagulant medication contact your prescribing physician for instructions.     Preventing Infections Before and After - Your Surgery    IMPORTANT INSTRUCTIONS      You play an important role in your health and preparation for surgery. To reduce the germs on your skin you will need to shower with CHG soap (Chorhexidine gluconate 4%) two times before surgery. CHG soap (Hibiclens, Hex-A-Clens or store brand)  CHG soap will be provided at your Preadmission Testing (PAT) appointment. If you do not have a PAT appointment before surgery, you may arrange to  CHG soap from our office or purchase CHG soap at a pharmacy, grocery or department store. You need to purchase TWO 4 ounce bottles to use for your 2 showers. Steps to follow:  Nirav Sussex your hair with your normal shampoo and your body with regular soap and rinse well to remove shampoo and soap from your skin. Wet a clean washcloth and turn off the shower. Put CHG soap on washcloth and apply to your entire body from the neck down. Do not use on your head, face or private parts(genitals). Do not use CHG soap on open sores, wounds or areas of skin irritation. Wash you body gently for 5 minutes. Do not wash your skin too hard. This soap does not create lather. Pay special attention to your underarms and from your belly button to your feet. Turn the shower back on and rinse well to get CHG soap off your body. Pat your skin dry with a clean, dry towel. Do not apply lotions or moisturizer. Put on clean clothes and sleep on fresh bed sheets and do not allow pets to sleep with you. Shower with CHG soap 2 times before your surgery  The evening before your surgery  The morning of your surgery      Tips to help prevent infections after your surgery:  Protect your surgical wound from germs:  Hand washing is the most important thing you and your caregivers can do to prevent infections. Keep your bandage clean and dry! Do not touch your surgical wound. Use clean, freshly washed towels and washcloths every time you shower; do not share bath linens with others.   Until your surgical wound is healed, wear clothing and sleep on bed linens each day that are clean and freshly washed. Do not allow pets to sleep in your bed with you or touch your surgical wound. Do not smoke - smoking delays wound healing. This may be a good time to stop smoking. If you have diabetes, it is important for you to manage your blood sugar levels properly before your surgery as well as after your surgery. Poorly managed blood sugar levels slow down wound healing and prevent you from healing completely. Patient Information Regarding COVID Restrictions    Day of Procedure    Please park in the parking deck or any designated visitor parking lot. Enter the facility through the Main Entrance of the hospital.  On the day of surgery, please provide the cell phone number of the person who will be waiting for you to the Patient Access representative at the time of registration. Masks are highly recommended in the hospital, but not required. Once your procedure and the immediate recovery period is completed, a nurse in the recovery area will contact your designated visitor to inform them of your room number or to otherwise review other pertinent information regarding your care. Social distancing practices are strongly encouraged in waiting areas and the cafeteria. The patient was contacted  in person. She verbalized understanding of all instructions does not  need reinforcement.

## 2022-12-23 NOTE — PERIOP NOTES
ABNORMAL LABS, OFFICE IS CLOSED TODAY ON 12/23/22, CALLED ON CALL MD SPOKE TO DR Jonel Hernandez, ASKED TO CALL OFFICE ON 12/27/22

## 2022-12-27 ENCOUNTER — TELEPHONE (OUTPATIENT)
Dept: GYNECOLOGY | Age: 87
End: 2022-12-27

## 2022-12-27 NOTE — PERIOP NOTES
Called surgeon's office to report abnormal labs, nurse is unavailable, requested a return call. Shonna Vidal from Dr. Mundo Wright office called back, abnormal labs were reported.

## 2022-12-29 ENCOUNTER — ANESTHESIA EVENT (OUTPATIENT)
Dept: SURGERY | Age: 87
End: 2022-12-29
Payer: MEDICARE

## 2022-12-30 ENCOUNTER — HOSPITAL ENCOUNTER (OUTPATIENT)
Age: 87
Setting detail: OUTPATIENT SURGERY
Discharge: HOME | End: 2022-12-30
Attending: OBSTETRICS & GYNECOLOGY | Admitting: OBSTETRICS & GYNECOLOGY
Payer: MEDICARE

## 2022-12-30 ENCOUNTER — ANESTHESIA (OUTPATIENT)
Dept: SURGERY | Age: 87
End: 2022-12-30
Payer: MEDICARE

## 2022-12-30 VITALS
OXYGEN SATURATION: 100 % | HEART RATE: 65 BPM | DIASTOLIC BLOOD PRESSURE: 76 MMHG | RESPIRATION RATE: 27 BRPM | WEIGHT: 150 LBS | BODY MASS INDEX: 24.11 KG/M2 | TEMPERATURE: 97.6 F | HEIGHT: 66 IN | SYSTOLIC BLOOD PRESSURE: 135 MMHG

## 2022-12-30 DIAGNOSIS — G89.18 POSTOPERATIVE PAIN: Primary | ICD-10-CM

## 2022-12-30 LAB
GLUCOSE BLD STRIP.AUTO-MCNC: 98 MG/DL (ref 65–117)
INR BLD: 1.3 (ref 0.9–1.2)
SERVICE CMNT-IMP: NORMAL

## 2022-12-30 PROCEDURE — 74011250636 HC RX REV CODE- 250/636: Performed by: STUDENT IN AN ORGANIZED HEALTH CARE EDUCATION/TRAINING PROGRAM

## 2022-12-30 PROCEDURE — 76210000020 HC REC RM PH II FIRST 0.5 HR: Performed by: OBSTETRICS & GYNECOLOGY

## 2022-12-30 PROCEDURE — 2709999900 HC NON-CHARGEABLE SUPPLY: Performed by: OBSTETRICS & GYNECOLOGY

## 2022-12-30 PROCEDURE — 74011000250 HC RX REV CODE- 250: Performed by: STUDENT IN AN ORGANIZED HEALTH CARE EDUCATION/TRAINING PROGRAM

## 2022-12-30 PROCEDURE — 85610 PROTHROMBIN TIME: CPT

## 2022-12-30 PROCEDURE — 82962 GLUCOSE BLOOD TEST: CPT

## 2022-12-30 PROCEDURE — 74011250636 HC RX REV CODE- 250/636: Performed by: NURSE ANESTHETIST, CERTIFIED REGISTERED

## 2022-12-30 PROCEDURE — 76210000006 HC OR PH I REC 0.5 TO 1 HR: Performed by: OBSTETRICS & GYNECOLOGY

## 2022-12-30 PROCEDURE — 76010000138 HC OR TIME 0.5 TO 1 HR: Performed by: OBSTETRICS & GYNECOLOGY

## 2022-12-30 PROCEDURE — 77030040361 HC SLV COMPR DVT MDII -B: Performed by: OBSTETRICS & GYNECOLOGY

## 2022-12-30 PROCEDURE — 76060000032 HC ANESTHESIA 0.5 TO 1 HR: Performed by: OBSTETRICS & GYNECOLOGY

## 2022-12-30 PROCEDURE — 88305 TISSUE EXAM BY PATHOLOGIST: CPT

## 2022-12-30 PROCEDURE — 77030033650 HC DEV TISS RMVL MYOSUR HOLO -F: Performed by: OBSTETRICS & GYNECOLOGY

## 2022-12-30 PROCEDURE — 77030041423 HC SYST FLUID MNGMT FLUENT HOLO -D: Performed by: OBSTETRICS & GYNECOLOGY

## 2022-12-30 PROCEDURE — 74011250636 HC RX REV CODE- 250/636: Performed by: OBSTETRICS & GYNECOLOGY

## 2022-12-30 PROCEDURE — 74011000250 HC RX REV CODE- 250: Performed by: OBSTETRICS & GYNECOLOGY

## 2022-12-30 RX ORDER — SODIUM CHLORIDE 0.9 % (FLUSH) 0.9 %
5-40 SYRINGE (ML) INJECTION AS NEEDED
Status: DISCONTINUED | OUTPATIENT
Start: 2022-12-30 | End: 2022-12-30 | Stop reason: HOSPADM

## 2022-12-30 RX ORDER — FENTANYL CITRATE 50 UG/ML
INJECTION, SOLUTION INTRAMUSCULAR; INTRAVENOUS AS NEEDED
Status: DISCONTINUED | OUTPATIENT
Start: 2022-12-30 | End: 2022-12-30 | Stop reason: HOSPADM

## 2022-12-30 RX ORDER — FENTANYL CITRATE 50 UG/ML
25 INJECTION, SOLUTION INTRAMUSCULAR; INTRAVENOUS
Status: DISCONTINUED | OUTPATIENT
Start: 2022-12-30 | End: 2022-12-30 | Stop reason: HOSPADM

## 2022-12-30 RX ORDER — LIDOCAINE HYDROCHLORIDE 10 MG/ML
0.1 INJECTION, SOLUTION EPIDURAL; INFILTRATION; INTRACAUDAL; PERINEURAL AS NEEDED
Status: DISCONTINUED | OUTPATIENT
Start: 2022-12-30 | End: 2022-12-30 | Stop reason: HOSPADM

## 2022-12-30 RX ORDER — MIDAZOLAM HYDROCHLORIDE 1 MG/ML
1 INJECTION, SOLUTION INTRAMUSCULAR; INTRAVENOUS AS NEEDED
Status: DISCONTINUED | OUTPATIENT
Start: 2022-12-30 | End: 2022-12-30 | Stop reason: HOSPADM

## 2022-12-30 RX ORDER — SODIUM CHLORIDE 0.9 % (FLUSH) 0.9 %
5-40 SYRINGE (ML) INJECTION EVERY 8 HOURS
Status: DISCONTINUED | OUTPATIENT
Start: 2022-12-30 | End: 2022-12-30 | Stop reason: HOSPADM

## 2022-12-30 RX ORDER — PROPOFOL 10 MG/ML
INJECTION, EMULSION INTRAVENOUS AS NEEDED
Status: DISCONTINUED | OUTPATIENT
Start: 2022-12-30 | End: 2022-12-30 | Stop reason: HOSPADM

## 2022-12-30 RX ORDER — SODIUM CHLORIDE, SODIUM LACTATE, POTASSIUM CHLORIDE, CALCIUM CHLORIDE 600; 310; 30; 20 MG/100ML; MG/100ML; MG/100ML; MG/100ML
1000 INJECTION, SOLUTION INTRAVENOUS CONTINUOUS
Status: DISCONTINUED | OUTPATIENT
Start: 2022-12-30 | End: 2022-12-30 | Stop reason: HOSPADM

## 2022-12-30 RX ORDER — ACETAMINOPHEN 325 MG/1
650 TABLET ORAL ONCE
Status: DISCONTINUED | OUTPATIENT
Start: 2022-12-30 | End: 2022-12-30 | Stop reason: HOSPADM

## 2022-12-30 RX ORDER — OXYCODONE HYDROCHLORIDE 5 MG/1
5 TABLET ORAL AS NEEDED
Status: DISCONTINUED | OUTPATIENT
Start: 2022-12-30 | End: 2022-12-30 | Stop reason: HOSPADM

## 2022-12-30 RX ORDER — ROPIVACAINE HYDROCHLORIDE 5 MG/ML
30 INJECTION, SOLUTION EPIDURAL; INFILTRATION; PERINEURAL AS NEEDED
Status: DISCONTINUED | OUTPATIENT
Start: 2022-12-30 | End: 2022-12-30 | Stop reason: HOSPADM

## 2022-12-30 RX ORDER — ONDANSETRON 2 MG/ML
INJECTION INTRAMUSCULAR; INTRAVENOUS AS NEEDED
Status: DISCONTINUED | OUTPATIENT
Start: 2022-12-30 | End: 2022-12-30 | Stop reason: HOSPADM

## 2022-12-30 RX ORDER — MORPHINE SULFATE 2 MG/ML
2 INJECTION, SOLUTION INTRAMUSCULAR; INTRAVENOUS
Status: DISCONTINUED | OUTPATIENT
Start: 2022-12-30 | End: 2022-12-30 | Stop reason: HOSPADM

## 2022-12-30 RX ORDER — PHENYLEPHRINE HCL IN 0.9% NACL 0.4MG/10ML
SYRINGE (ML) INTRAVENOUS AS NEEDED
Status: DISCONTINUED | OUTPATIENT
Start: 2022-12-30 | End: 2022-12-30 | Stop reason: HOSPADM

## 2022-12-30 RX ORDER — FENTANYL CITRATE 50 UG/ML
50 INJECTION, SOLUTION INTRAMUSCULAR; INTRAVENOUS AS NEEDED
Status: DISCONTINUED | OUTPATIENT
Start: 2022-12-30 | End: 2022-12-30 | Stop reason: HOSPADM

## 2022-12-30 RX ORDER — TRAMADOL HYDROCHLORIDE 50 MG/1
50 TABLET ORAL
Qty: 20 TABLET | Refills: 0 | Status: SHIPPED | OUTPATIENT
Start: 2022-12-30 | End: 2023-01-04

## 2022-12-30 RX ORDER — HYDROMORPHONE HYDROCHLORIDE 1 MG/ML
0.2 INJECTION, SOLUTION INTRAMUSCULAR; INTRAVENOUS; SUBCUTANEOUS
Status: DISCONTINUED | OUTPATIENT
Start: 2022-12-30 | End: 2022-12-30 | Stop reason: HOSPADM

## 2022-12-30 RX ORDER — ONDANSETRON 2 MG/ML
4 INJECTION INTRAMUSCULAR; INTRAVENOUS AS NEEDED
Status: DISCONTINUED | OUTPATIENT
Start: 2022-12-30 | End: 2022-12-30 | Stop reason: HOSPADM

## 2022-12-30 RX ADMIN — ONDANSETRON HYDROCHLORIDE 4 MG: 2 INJECTION, SOLUTION INTRAMUSCULAR; INTRAVENOUS at 08:53

## 2022-12-30 RX ADMIN — FENTANYL CITRATE 25 MCG: 50 INJECTION INTRAMUSCULAR; INTRAVENOUS at 08:39

## 2022-12-30 RX ADMIN — CEFOXITIN SODIUM 2 G: 2 POWDER, FOR SOLUTION INTRAVENOUS at 08:43

## 2022-12-30 RX ADMIN — Medication 80 MCG: at 08:45

## 2022-12-30 RX ADMIN — Medication 80 MCG: at 08:41

## 2022-12-30 RX ADMIN — PROPOFOL 20 MG: 10 INJECTION, EMULSION INTRAVENOUS at 08:43

## 2022-12-30 RX ADMIN — PROPOFOL 20 MG: 10 INJECTION, EMULSION INTRAVENOUS at 08:40

## 2022-12-30 RX ADMIN — PROPOFOL 50 MCG/KG/MIN: 10 INJECTION, EMULSION INTRAVENOUS at 08:45

## 2022-12-30 RX ADMIN — SODIUM CHLORIDE, POTASSIUM CHLORIDE, SODIUM LACTATE AND CALCIUM CHLORIDE 1000 ML: 600; 310; 30; 20 INJECTION, SOLUTION INTRAVENOUS at 08:21

## 2022-12-30 RX ADMIN — FENTANYL CITRATE 25 MCG: 50 INJECTION INTRAMUSCULAR; INTRAVENOUS at 08:45

## 2022-12-30 NOTE — H&P
[address] [facility] [address]  P [phone]  F [phone]    Office Note  Patient ID:  Name:  Naomie Horowitz  MRN:  [id number]  :  1935/87 y.o. Date:  2022      HISTORY OF PRESENT ILLNESS:  Naomie Horowitz is a 80 y.o.  postmenopausal female who is a new patient being seen for postmenopausal bleeding. She is referred by Dr. Kathleen Trujillo with [facility]. She had presented to the ER at [facility] with vaginal bleeding. An ultrasound there demonstrated uterine fibroids and a thickened endometrium. An endometrial biopsy was attempted in the office, but unsuccessful. The cervix was reportedly flush with the vaginal apex. I have been asked to see her in consultation for further evaluation and management of her bleeding. She has congestive heart failure and was told that she is not a candidate for any kind of surgery. She last saw her cardiologist about 3 months ago and has another appointment scheduled next month. ROS:   and GI review:  Negative  Cardiopulmonary review:  Negative   Musculoskeletal:  Negative    A comprehensive review of systems was negative except for that written in the History of Present Illness. , 10 point ROS      OB/GYN ROS/HX:    Vaginal delivery x 1      Problem List:  There are no problems to display for this patient.     PMH:  Past Medical History:   Diagnosis Date    Arthritis     Heart failure ([address]     \"leaky valve\"      PSH:  Past Surgical History:   Procedure Laterality Date    HX GI      rectal    HX HEENT      \"eye surgery\"    HX PACEMAKER        Social History:  Social History     Tobacco Use    Smoking status: Never    Smokeless tobacco: Never   Substance Use Topics    Alcohol use: Yes     Comment: occasionally has 1 glass of beer or wine      Family History:  Family History   Problem Relation Age of Onset    Anesth Problems Neg Hx Medications: (reviewed)  No current facility-administered medications for this encounter. Allergies: (reviewed)  No Known Allergies       OBJECTIVE:    Physical Exam:  VITAL SIGNS: There were no vitals filed for this visit. There is no height or weight on file to calculate BMI. GENERAL JAIRO: Conversant, alert, oriented. No acute distress. HEENT: HEENT. No thyroid enlargement. No JVD. Neck: Supple without restrictions. RESPIRATORY: Clear to auscultation and percussion to the bases. No CVAT. CARDIOVASC: RRR without murmur/rub. GASTROINT: soft, non-tender, without masses or organomegaly   MUSCULOSKEL: no joint tenderness, deformity or swelling   EXTREMITIES: extremities normal, atraumatic, no cyanosis or edema   PELVIC: Normal external genitalia. Normal vagina. Small dimple of a cervix. Uterus not enlarged and mobile. Large amount of stool in the rectum. RECTAL: Deferred   MACRINA SURVEY: No suspicious lymphadenopathy or edema noted. NEURO: Grossly intact. No acute deficit. Lab Data:    Lab Results   Component Value Date/Time    WBC 5.6 12/22/2022 02:45 PM    HGB 12.0 12/22/2022 02:45 PM    HCT 36.1 12/22/2022 02:45 PM    PLATELET 628 28/48/1951 02:45 PM    MCV 87.0 12/22/2022 02:45 PM     Lab Results   Component Value Date/Time    Sodium 137 12/22/2022 02:45 PM    Potassium 4.3 12/22/2022 02:45 PM    Chloride 104 12/22/2022 02:45 PM    CO2 29 12/22/2022 02:45 PM    Anion gap 4 (L) 12/22/2022 02:45 PM    Glucose 177 (H) 12/22/2022 02:45 PM    BUN 30 (H) 12/22/2022 02:45 PM    Creatinine 1.11 (H) 12/22/2022 02:45 PM    BUN/Creatinine ratio 27 (H) 12/22/2022 02:45 PM    GFR est AA 50 (L) 09/24/2021 01:34 PM    GFR est non-AA 41 (L) 09/24/2021 01:34 PM    Calcium 9.4 12/22/2022 02:45 PM         Imaging: Outside imaging unavailable at time of visit.       IMPRESSION/PLAN:  Yamileth Mack is a 80 y.o. female with a diagnosis of postmenopausal bleeding, associated with a thickened endometrium. I reviewed with Omer Ayers her medical records, physical exam, and review of symptoms. I discussed with her and her daughter the differential diagnosis of her bleeding, including both benign and malignant conditions. I recommended a hysteroscopy/D&C to evaluate the bleeding and thickened endometrium. We should be able to do that with minimal anesthesia. She was counseled on the risks, benefits, indications and alternatives of the planned procedure. We discussed the possible surgical risks of bleeding, infection, potential injury to other organs/structures, and the risks of anesthesia. Her questions were answered to her satisfaction and she wishes to proceed as planned. If we find that a malignancy is present, we will then need to discuss the possibility of hysterectomy. We will then need clearance from her cardiologist.        An electronic signature was used to sign this note. Corinna Painter MD  12/30/22       Date of Surgery Update  Omer Ayers was seen and examined. History and physical has been reviewed. The patient has been examined. There have been no significant clinical changes since the completion of the originally dated History and Physical.  Patient identified by surgeon; surgical site was confirmed by patient and surgeon. Planned procedure again discussed. Questions invited and answered. Surgical consent signed by patient. Patient wishes to proceed with planned procedure.     Corinna Painter MD  December 30, 2022  7:20 AM

## 2022-12-30 NOTE — ANESTHESIA PREPROCEDURE EVALUATION
Relevant Problems   No relevant active problems       Anesthetic History   No history of anesthetic complications            Review of Systems / Medical History  Patient summary reviewed, nursing notes reviewed and pertinent labs reviewed    Pulmonary  Within defined limits                 Neuro/Psych   Within defined limits           Cardiovascular    Hypertension  Valvular problems/murmurs: tricuspid insufficiency    CHF: NYHA Classification III    Pacemaker (SSS)    Exercise tolerance: >4 METS  Comments: ECHO 12/2016: EF 40%, No Significant CAD,    GI/Hepatic/Renal  Within defined limits              Endo/Other  Within defined limits           Other Findings   Comments: Pulmonary hypertension  Paroxysmal A fib on Warfarin         Physical Exam    Airway  Mallampati: II  TM Distance: 4 - 6 cm  Neck ROM: normal range of motion   Mouth opening: Normal     Cardiovascular    Rhythm: regular  Rate: normal         Dental  No notable dental hx       Pulmonary  Breath sounds clear to auscultation               Abdominal  GI exam deferred       Other Findings            Anesthetic Plan    ASA: 3  Anesthesia type: MAC and general - backup          Induction: Intravenous  Anesthetic plan and risks discussed with: Patient

## 2022-12-30 NOTE — PERIOP NOTES
I have reviewed discharge instructions with the patient and caregiver. The patient and caregiver verbalized understanding. All belongings returned to patient in pacu.

## 2022-12-30 NOTE — DISCHARGE INSTRUCTIONS
[facility] GYNECOLOGIC ONCOLOGY  A department of [facility] [address], [address], [address] [place]  P [phone]  F [phone]       [address] [place] [address]       Please review your instructions with your nurse and ask any questions so you have all the information you need to recover well at home. If you do not feel you have everything you need to succeed and be safe after you leave the hospital, please discuss these concerns with your nurse. As always, call for any questions at home. Take Home Medications     See Discharge Medication Review provided to you by your nurse. If you did not receive one, request this prior to your discharge. It is important that you take your medications as they are prescribed. Keep your medications in the bottles provided by the pharmacist and keep a list of the medication names, dosages, times to be taken and what they are for in your wallet. Do not take other medications without consulting your doctor. If you are prescribed enoxaparin (Lovenox) and are taking a baby aspirin daily, please hold this medication until your course of enoxaparin is completed. If you no longer need your prescribed pain medication, you may take Tylenol or Aleve alone. You should take a daily gentle stool softener such as a colace pill or dulcolax suppository for constipation as this is not uncommon after surgery and/or while on pain medication. If not prescribed, this can be found over the counter. If constipation persists for >24 hours you should take a dose of Milk of Magnesia. Call if your constipation continues. Diet    Stay hydrated and drink fluids such as gatorade and water. This will also help prevent constipation and dehydration. Limit somewhat any usual caffeine intake of beverages such as soda, tea and coffee as this may serve to dehydrate you.   For the first 2-3 days keep a low fiber diet avoiding raw vegetables or fruits with skin. A diet consisting of soup, cereal, yogurt, eggs, fish, Boost/Ensure. Avoid fatty/greasy foods. If nauseated, keep your diet limited to liquids and call if this persists. Activity    If possible, have someone with you at all times until you feel stable. Gradually increase your activity each day. There are generally no restrictions on walking, climbing stairs or riding in a car. Try to walk at least 4 times per day. Showers are okay. If you have an incision, no tub bathing/swimming for four weeks. No driving for 2 week and/or while on pain medication. There are no lifting restrictions if you did not have surgery. If you had surgery, the following restrictions are in place: If you had a laparoscopic procedure, no lifting greater than 25 lbs for 3 weeks. If you had a hysterectomy, nothing per vagina for 6-8 weeks. If you had any type of vaginal procedure, you may experience vaginal spotting. Should the bleeding require more that 4 pads a day please call our office. Incision    You should expect some discomfort in the area of your incision, particularly as you increase your activity. If you notice an area of increasing redness or new drainage, please call your doctor. Staples are generally removed in 10-14 days. Many incisions will have buried absorbable sutures, which do not need to be removed and are covered by protective glue. This will come off over time. Causes For Concern    If any of the following occur, please call our office and speak with the Nurse/aid who will help you with your problem or ask the doctor to call you. Problems with the incision, including increasing pain, swelling, redness or drainage. Inability to pass urine   Increasing abdominal pain despite medication  Persisting nausea or vomiting. Fever or chills and a temperature >101F  Constipation (no bowel movement for three days). Diarrhea (more than three watery stools within 24 hours). Excessive vaginal or wound bleeding. If after hours and you cannot reach an on-call physician, call 911. Follow-Up    Call [phone] to schedule an appointment with Dr. Sabrina Ignacio in 2 weeks. Information obtained by :  I understand that if any problems occur once I am at home I am to contact my physician. I understand and acknowledge receipt of the instructions indicated above. Physician's or R.N.'s Signature                                                                  Date/Time                                                                                                                                              Patient or Representative Signature                                                          Date/Time     ______________________________________________________________________    Anesthesia Discharge Instructions    After general anesthesia or intervenous sedation, for 24 hours or while taking prescription Narcotics:  Limit your activities  Do not drive or operate hazardous machinery  If you have not urinated within 8 hours after discharge, please contact your surgeon on call. Do not make important personal or business decisions  Do not drink alcoholic beverages    Report the following to your surgeon:  Excessive pain, swelling, redness or odor of or around the surgical area  Temperature over 100.5 degrees  Nausea and vomiting lasting longer than 4 hours or if unable to take medication  Any signs of decreased circulation or nerve impairment to extremity:  Change in color, persistent numbness, tingling, coldness or increased pain.   Any questions

## 2022-12-30 NOTE — OP NOTES
Gynecologic Oncology Operative Report    Mary Lighter  12/30/2022    Pre-operative dx:  Postmenopausal bleeding    Post-operative dx:  Postmenopausal bleeding    Procedure:  Hysteroscopy, dilatation and curettage    Surgeon:  Yessenia Chapman MD    Assistant:  None     Anesthesia:  GETA    EBL:  Minimal    Fluid deficit:  541 cc    Complications:  None    Implants:  None    Specimens:    ID Type Source Tests Collected by Time Destination   1 : ENDOMETRIAL CURETTINGS Fresh Uterus  Beth Hernadez MD 12/30/2022 7514 Pathology     Operative indications:  81 yo BF with postmenopausal bleeding, suspicious for malignancy. I was unable to perform a biopsy in the office. I recommended a hysteroscopy/D&C. Operative findings:  Atrophic vagina and cervix. Shaggy endometrium. Posterior, submucosal fibroid. Procedure in detail:  After the risks, benefits, indications and alternatives of the procedure were discussed with the patient and informed consent was obtained, the patient was taken to the operating room. She was identified  and then administered general anesthesia and placed in the dorsal lithotomy position in [facility] and prepped and draped in the usual fashion. An open-sided Graves speculum was placed into the vagina to visualize the cervix. The cervix was then grasped with a single-tooth tenaculum. The uterus was sounded to determine the size of the uterine cavity. It was then progressively dilated with Hanks dilators to accommodate the Myosure hysteroscope. The hysteroscope was then inserted, and the uterine cavity was insufflated with normal saline. The above mentioned findings were noted. A directed curettage of the endometrial cavity was then performed using the Myosure Lite device. Minimal bleeding was noted at this time. The single-tooth tenaculum was removed and the weighted speculum was removed.  The patient was then taken out of [facility], awakened from anesthesia, and taken to the recovery room in stable condition. All sponge, lap, needle counts were correct.        Mitchell Castaneda MD  12/30/2022  9:01 AM

## 2022-12-30 NOTE — BRIEF OP NOTE
Brief Postoperative Note    Patient: Andre Manley  YOB: 1935  MRN: [id number]    Date of Procedure: 12/30/2022     Pre-Op Diagnosis: Postmenopausal bleeding [N95.0]    Post-Op Diagnosis: Same as preoperative diagnosis. Procedure(s): HYSTEROSCOPY, DILATION AND CURETTAGE    Surgeon(s):  Katelynn Clifton MD    Surgical Assistant: None    Anesthesia: General     Estimated Blood Loss (mL): Minimal    Complications: None    Specimens:   ID Type Source Tests Collected by Time Destination   1 : ENDOMETRIAL CURETTINGS Fresh Uterus  Katelynn Clifton MD 12/30/2022 9318 Pathology        Implants: * No implants in log *    Drains: * No LDAs found *    Findings: Atrophic vagina and cervix. Shaggy endometrium. Posterior, submucosal fibroid.       Electronically Signed by Sergey Jeter MD on 12/30/2022 at 9:00 AM

## 2022-12-30 NOTE — ANESTHESIA POSTPROCEDURE EVALUATION
Procedure(s): HYSTEROSCOPY, DILATION AND CURETTAGE.     MAC, general - backup    Anesthesia Post Evaluation      Multimodal analgesia: multimodal analgesia used between 6 hours prior to anesthesia start to PACU discharge  Patient location during evaluation: PACU  Level of consciousness: awake  Pain management: adequate  Airway patency: patent  Anesthetic complications: no  Cardiovascular status: acceptable  Respiratory status: acceptable  Hydration status: acceptable  Post anesthesia nausea and vomiting:  none  Final Post Anesthesia Temperature Assessment:  Normothermia (36.0-37.5 degrees C)      INITIAL Post-op Vital signs:   Vitals Value Taken Time   /76 12/30/22 0945   Temp 36.4 °C (97.6 °F) 12/30/22 0945   Pulse 65 12/30/22 0945   Resp 27 12/30/22 0945   SpO2 100 % 12/30/22 0945

## 2023-01-19 ENCOUNTER — OFFICE VISIT (OUTPATIENT)
Dept: GYNECOLOGY | Age: 88
End: 2023-01-19
Payer: MEDICARE

## 2023-01-19 VITALS
WEIGHT: 149 LBS | HEART RATE: 83 BPM | DIASTOLIC BLOOD PRESSURE: 85 MMHG | HEIGHT: 66 IN | SYSTOLIC BLOOD PRESSURE: 155 MMHG | BODY MASS INDEX: 23.95 KG/M2

## 2023-01-19 DIAGNOSIS — N95.0 POSTMENOPAUSAL BLEEDING: Primary | ICD-10-CM

## 2023-01-19 DIAGNOSIS — R93.89 THICKENED ENDOMETRIUM: ICD-10-CM

## 2023-01-19 PROCEDURE — 99024 POSTOP FOLLOW-UP VISIT: CPT | Performed by: OBSTETRICS & GYNECOLOGY

## 2023-01-19 NOTE — PROGRESS NOTES
[address] [place] [address] [address], [address] [facility]  P [phone]  F [phone]    Postoperative Office Note  Patient ID:  Name: Jevon Celeste  MRM: [id number]  : 1935/87 y.o. Date: 2023    Diagnosis:     ICD-10-CM ICD-9-CM    1. Postmenopausal bleeding  N95.0 627.1       2. Thickened endometrium  R93.89 793.5           Problem List: There is no problem list on file for this patient. SUBJECTIVE:    Jevon Celeste is a 80 y.o. female who presents for followup postoperative care following hysteroscopy/D&C. Operative findings:  Atrophic vagina and cervix. Shaggy endometrium. Posterior, submucosal fibroid. The patient's pathology revealed: FINAL PATHOLOGIC DIAGNOSIS   Endometrium; curettage:   Fragments of proliferative pattern endometrium with features suggestive of endometrial polyp. Increased number of plasma cells suggestive of chronic endometritis. Fragments of endocervical mucosa with focal immature squamous metaplasia and fragments of smooth muscle/myometrium with stromal calcifications. No atypia and no malignancy identified. Currently she has no problems with eating, bowel movements, voiding, or their wound. Appetite is good. Eating a regular diet without difficulty. Bowel movements are regular. The patient is not having any pain. Medications:     Current Outpatient Medications on File Prior to Visit   Medication Sig Dispense Refill    valACYclovir (VALTREX) 500 mg tablet daily. senna (SENOKOT) 8.6 mg tablet Take 2 Tablets by mouth as needed. docusate sodium (COLACE) 100 mg capsule Take 100 mg by mouth nightly. furosemide (LASIX) 80 mg tablet Take 80 mg by mouth daily. warfarin (COUMADIN) 2.5 mg tablet Take 5 mg by mouth daily. Monday - Wednesday - Friday      warfarin (COUMADIN) 1 mg tablet Take 7.5 mg by mouth daily.  Tuesday - Thursday - Saturday -       potassium chloride (K-DUR, KLOR-CON) 20 mEq tablet Take 60 mEq by mouth two (2) times a day. metOLazone (ZAROXOLYN) 2.5 mg tablet Take 5 mg by mouth daily. Takes one every third day. No current facility-administered medications on file prior to visit. Allergies:   No Known Allergies  Past Medical History:   Diagnosis Date    Arthritis     Heart failure ([address]     \"leaky valve\"     Past Surgical History:   Procedure Laterality Date    HX GI      rectal    HX HEENT      \"eye surgery\"    HX PACEMAKER       Social History     Socioeconomic History    Marital status:      Spouse name: Not on file    Number of children: Not on file    Years of education: Not on file    Highest education level: Not on file   Occupational History    Not on file   Tobacco Use    Smoking status: Never    Smokeless tobacco: Never   Substance and Sexual Activity    Alcohol use: Yes     Comment: occasionally has 1 glass of beer or wine    Drug use: Never    Sexual activity: Not on file   Other Topics Concern    Not on file   Social History Narrative    Not on file     Social Determinants of Health     Financial Resource Strain: Not on file   Food Insecurity: Not on file   Transportation Needs: Not on file   Physical Activity: Not on file   Stress: Not on file   Social Connections: Not on file   Intimate Partner Violence: Not on file   Housing Stability: Not on file       OBJECTIVE:    Vitals:   Vitals:    01/19/23 1059   BP: (!) 155/85   Pulse: 83   Weight: 149 lb (67.6 kg)   Height: 5' 5.98\" (1.676 m)     Physical Examination:     General:  alert, cooperative, no distress   Lungs: lungs clear to auscultation   Cardiac: Regular rate and rhythm   Abdomen: soft, bowel sounds active, non-tender  No CVA tenderness   Incision: healing well   Pelvic: Exam deferred. Rectal: not done   Extremity:   extremities normal, atraumatic, no cyanosis or edema     IMPRESSION:    Adiel Liu is doing well postoperatively.   She has a diagnosis of chronic endometritis and endometrial polyps, but no evidence of malignancy. Medical problems: There is no problem list on file for this patient. PLAN:    The operative procedures and clinical results have been reviewed with the patient. Implications of diagnosis discussed at length. All questions answered. The patient may return to all of her normal activities without restrictions. I will see the patient back prn. The patient is advised to call our office with any problems or concerns. An electronic signature was used to sign this note.     Alejandro Amaro MD  1/19/2023

## 2023-05-21 RX ORDER — PSEUDOEPHEDRINE HCL 30 MG
100 TABLET ORAL NIGHTLY
COMMUNITY

## 2023-05-21 RX ORDER — VALACYCLOVIR HYDROCHLORIDE 500 MG/1
TABLET, FILM COATED ORAL DAILY
COMMUNITY
Start: 2022-12-06

## 2023-05-21 RX ORDER — WARFARIN SODIUM 2.5 MG/1
5 TABLET ORAL DAILY
COMMUNITY

## 2023-05-21 RX ORDER — SENNA PLUS 8.6 MG/1
2 TABLET ORAL PRN
COMMUNITY

## 2023-05-21 RX ORDER — FUROSEMIDE 80 MG
80 TABLET ORAL DAILY
COMMUNITY

## 2023-05-21 RX ORDER — POTASSIUM CHLORIDE 20 MEQ/1
60 TABLET, EXTENDED RELEASE ORAL 2 TIMES DAILY
COMMUNITY

## 2023-05-21 RX ORDER — METOLAZONE 2.5 MG/1
5 TABLET ORAL DAILY
COMMUNITY

## 2023-05-21 RX ORDER — WARFARIN SODIUM 1 MG/1
7.5 TABLET ORAL DAILY
COMMUNITY

## (undated) DEVICE — SET ENDOSCP SEAL HYSTEROSCOPE RIG OUTFLO CHN DISP MYOSURE

## (undated) DEVICE — GARMENT,MEDLINE,DVT,INT,CALF,MED, GEN2: Brand: MEDLINE

## (undated) DEVICE — GOWN,SIRUS,NONRNF,SETINSLV,XL,20/CS: Brand: MEDLINE

## (undated) DEVICE — FLUID MGMT SYS FLUENT KIT 6/PK

## (undated) DEVICE — TOWEL SURG W17XL27IN STD BLU COT NONFENESTRATED PREWASHED

## (undated) DEVICE — PREMIUM WET SKIN PREP TRAY: Brand: MEDLINE INDUSTRIES, INC.

## (undated) DEVICE — PAD,SANITARY,11 IN,MAXI,N-STRL,IND WRAP: Brand: MEDLINE

## (undated) DEVICE — SPONGE GZ W4XL4IN COT RADPQ HIGHLY ABSRB

## (undated) DEVICE — MARKER,SKIN,WI/RULER AND LABELS: Brand: MEDLINE

## (undated) DEVICE — COVER,TABLE,60X90,STERILE: Brand: MEDLINE

## (undated) DEVICE — DEVICE TISS RETRV 3MMX25.25IN -- MYOSURE

## (undated) DEVICE — SOLUTION IRRIG 3000ML 0.9% SOD CHL USP UROMATIC PLAS CONT

## (undated) DEVICE — SHEET,DRAPE,UNDERBUTTOCK,GRAD POUCH,PORT: Brand: MEDLINE

## (undated) DEVICE — DRAPE,LITHOTOMY,STERILE: Brand: MEDLINE

## (undated) DEVICE — GLOVE SURG SZ 75 L1212IN FNGR THK138MIL BRN LTX FREE

## (undated) DEVICE — BASIC SINGLE BASIN BTC-LF: Brand: MEDLINE INDUSTRIES, INC.

## (undated) DEVICE — COVER LT HNDL PLAS RIG 1 PER PK